# Patient Record
Sex: FEMALE | Race: OTHER | NOT HISPANIC OR LATINO | URBAN - METROPOLITAN AREA
[De-identification: names, ages, dates, MRNs, and addresses within clinical notes are randomized per-mention and may not be internally consistent; named-entity substitution may affect disease eponyms.]

---

## 2020-10-23 ENCOUNTER — EMERGENCY (EMERGENCY)
Facility: HOSPITAL | Age: 65
LOS: 1 days | Discharge: HOME | End: 2020-10-23
Attending: EMERGENCY MEDICINE | Admitting: STUDENT IN AN ORGANIZED HEALTH CARE EDUCATION/TRAINING PROGRAM
Payer: MEDICARE

## 2020-10-23 VITALS
DIASTOLIC BLOOD PRESSURE: 90 MMHG | TEMPERATURE: 98 F | RESPIRATION RATE: 18 BRPM | OXYGEN SATURATION: 100 % | HEART RATE: 92 BPM | SYSTOLIC BLOOD PRESSURE: 142 MMHG

## 2020-10-23 PROCEDURE — 12001 RPR S/N/AX/GEN/TRNK 2.5CM/<: CPT

## 2020-10-23 PROCEDURE — 99283 EMERGENCY DEPT VISIT LOW MDM: CPT | Mod: 25

## 2020-10-23 NOTE — ED PROVIDER NOTE - OBJECTIVE STATEMENT
Patient was cutting open package and cut her thumb on the lt. +++bleeding. did not stop with presure. nl motion, no joint involement. nl sensation.  Patient with htn as hx no blood thinners.

## 2020-10-23 NOTE — ED PROVIDER NOTE - ATTENDING CONTRIBUTION TO CARE
I performed a history and physical exam of the patient and discussed their management with the resident and /or advanced care provider. I reviewed the resident and /or ACP's note and agree with the documented findings and plan of care. My medical decision making and observations are found above.  Lungs clear, 2 cm laceration on pad of thumb.

## 2020-10-23 NOTE — ED PROCEDURE NOTE - ATTENDING CONTRIBUTION TO CARE
I Addi Cortez MD discussed the procedure with the resident and or ACP and went over risks and benefits as well as indications for the procedure. I was present for key portions of the procedure itself and assisted as needed.

## 2020-10-23 NOTE — ED PROVIDER NOTE - PMH
Essential hypertension  Essential hypertension  Hyperparathyroidism  Hyperparathyroidism  Uterine leiomyoma  Fibroid uterus

## 2020-10-23 NOTE — ED PROVIDER NOTE - PATIENT PORTAL LINK FT
You can access the FollowMyHealth Patient Portal offered by [facility] by registering at the following website: [email or web address]. By joining [facility]’s FollowMyHealth portal, you will also be able to view your health information using other applications (apps) compatible with our system.

## 2020-10-23 NOTE — ED PROVIDER NOTE - CLINICAL SUMMARY MEDICAL DECISION MAKING FREE TEXT BOX
Dana: laceration whichg opens on pad of thumb. will need a few stitches. dermadond would not work well with

## 2020-10-29 ENCOUNTER — EMERGENCY (EMERGENCY)
Facility: HOSPITAL | Age: 65
LOS: 1 days | Discharge: HOME | End: 2020-10-29
Attending: EMERGENCY MEDICINE | Admitting: EMERGENCY MEDICINE
Payer: MEDICARE

## 2020-10-29 VITALS
SYSTOLIC BLOOD PRESSURE: 147 MMHG | TEMPERATURE: 98 F | HEART RATE: 68 BPM | OXYGEN SATURATION: 100 % | DIASTOLIC BLOOD PRESSURE: 93 MMHG | RESPIRATION RATE: 16 BRPM

## 2020-10-29 PROCEDURE — L9995: CPT

## 2020-10-29 NOTE — ED PROVIDER NOTE - PATIENT PORTAL LINK FT
You can access the FollowMyHealth Patient Portal offered by [facility] by registering at the following website: [email or web address]. By joining [facility]’s FollowMyHealth portal, you will also be able to view your health information using other applications (apps) compatible with our system.

## 2020-10-29 NOTE — ED PROVIDER NOTE - PHYSICAL EXAMINATION
Attending/Jonas: Well-appearing, NAD; Left thumb-ventral aspect-wound well-healed, 4 interrupted sutures in -place no STS, d/c or erythema

## 2020-10-29 NOTE — ED PROVIDER NOTE - NSFOLLOWUPINSTRUCTIONS_ED_ALL_ED_FT
Apply antibiotic ointment to the affected area twice a day    If develop swelling, discharge, redness return back to the emergency department

## 2021-04-03 ENCOUNTER — EMERGENCY (EMERGENCY)
Facility: HOSPITAL | Age: 66
LOS: 1 days | Discharge: HOME | End: 2021-04-03
Attending: EMERGENCY MEDICINE | Admitting: EMERGENCY MEDICINE
Payer: MEDICARE

## 2021-04-03 VITALS
DIASTOLIC BLOOD PRESSURE: 101 MMHG | TEMPERATURE: 98 F | RESPIRATION RATE: 20 BRPM | SYSTOLIC BLOOD PRESSURE: 160 MMHG | OXYGEN SATURATION: 98 % | HEART RATE: 97 BPM

## 2021-04-03 VITALS
RESPIRATION RATE: 20 BRPM | TEMPERATURE: 98 F | OXYGEN SATURATION: 100 % | HEART RATE: 72 BPM | DIASTOLIC BLOOD PRESSURE: 100 MMHG | SYSTOLIC BLOOD PRESSURE: 160 MMHG

## 2021-04-03 LAB
ALBUMIN SERPL ELPH-MCNC: 4.1 G/DL — SIGNIFICANT CHANGE UP (ref 3.3–5)
ALP SERPL-CCNC: 94 U/L — SIGNIFICANT CHANGE UP (ref 40–120)
ALT FLD-CCNC: 12 U/L — SIGNIFICANT CHANGE UP (ref 4–33)
ANION GAP SERPL CALC-SCNC: 11 MMOL/L — SIGNIFICANT CHANGE UP (ref 7–14)
ANION GAP SERPL CALC-SCNC: 9 MMOL/L — SIGNIFICANT CHANGE UP (ref 7–14)
APTT BLD: 32 SEC — SIGNIFICANT CHANGE UP (ref 27–36.3)
AST SERPL-CCNC: 35 U/L — HIGH (ref 4–32)
BASOPHILS # BLD AUTO: 0.03 K/UL — SIGNIFICANT CHANGE UP (ref 0–0.2)
BASOPHILS NFR BLD AUTO: 0.4 % — SIGNIFICANT CHANGE UP (ref 0–2)
BILIRUB SERPL-MCNC: 0.4 MG/DL — SIGNIFICANT CHANGE UP (ref 0.2–1.2)
BLD GP AB SCN SERPL QL: NEGATIVE — SIGNIFICANT CHANGE UP
BUN SERPL-MCNC: 18 MG/DL — SIGNIFICANT CHANGE UP (ref 7–23)
BUN SERPL-MCNC: 18 MG/DL — SIGNIFICANT CHANGE UP (ref 7–23)
CALCIUM SERPL-MCNC: 10.4 MG/DL — SIGNIFICANT CHANGE UP (ref 8.4–10.5)
CALCIUM SERPL-MCNC: 10.7 MG/DL — HIGH (ref 8.4–10.5)
CHLORIDE SERPL-SCNC: 104 MMOL/L — SIGNIFICANT CHANGE UP (ref 98–107)
CHLORIDE SERPL-SCNC: 106 MMOL/L — SIGNIFICANT CHANGE UP (ref 98–107)
CO2 SERPL-SCNC: 23 MMOL/L — SIGNIFICANT CHANGE UP (ref 22–31)
CO2 SERPL-SCNC: 26 MMOL/L — SIGNIFICANT CHANGE UP (ref 22–31)
CREAT SERPL-MCNC: 0.74 MG/DL — SIGNIFICANT CHANGE UP (ref 0.5–1.3)
CREAT SERPL-MCNC: 0.76 MG/DL — SIGNIFICANT CHANGE UP (ref 0.5–1.3)
EOSINOPHIL # BLD AUTO: 0.1 K/UL — SIGNIFICANT CHANGE UP (ref 0–0.5)
EOSINOPHIL NFR BLD AUTO: 1.3 % — SIGNIFICANT CHANGE UP (ref 0–6)
GLUCOSE SERPL-MCNC: 101 MG/DL — HIGH (ref 70–99)
GLUCOSE SERPL-MCNC: 103 MG/DL — HIGH (ref 70–99)
HCT VFR BLD CALC: 43.5 % — SIGNIFICANT CHANGE UP (ref 34.5–45)
HGB BLD-MCNC: 13.9 G/DL — SIGNIFICANT CHANGE UP (ref 11.5–15.5)
IANC: 3.96 K/UL — SIGNIFICANT CHANGE UP (ref 1.5–8.5)
IMM GRANULOCYTES NFR BLD AUTO: 0.1 % — SIGNIFICANT CHANGE UP (ref 0–1.5)
INR BLD: 1 RATIO — SIGNIFICANT CHANGE UP (ref 0.88–1.16)
LYMPHOCYTES # BLD AUTO: 2.85 K/UL — SIGNIFICANT CHANGE UP (ref 1–3.3)
LYMPHOCYTES # BLD AUTO: 37.6 % — SIGNIFICANT CHANGE UP (ref 13–44)
MCHC RBC-ENTMCNC: 26.1 PG — LOW (ref 27–34)
MCHC RBC-ENTMCNC: 32 GM/DL — SIGNIFICANT CHANGE UP (ref 32–36)
MCV RBC AUTO: 81.8 FL — SIGNIFICANT CHANGE UP (ref 80–100)
MONOCYTES # BLD AUTO: 0.63 K/UL — SIGNIFICANT CHANGE UP (ref 0–0.9)
MONOCYTES NFR BLD AUTO: 8.3 % — SIGNIFICANT CHANGE UP (ref 2–14)
NEUTROPHILS # BLD AUTO: 3.96 K/UL — SIGNIFICANT CHANGE UP (ref 1.8–7.4)
NEUTROPHILS NFR BLD AUTO: 52.3 % — SIGNIFICANT CHANGE UP (ref 43–77)
NRBC # BLD: 0 /100 WBCS — SIGNIFICANT CHANGE UP
NRBC # FLD: 0 K/UL — SIGNIFICANT CHANGE UP
PLATELET # BLD AUTO: 277 K/UL — SIGNIFICANT CHANGE UP (ref 150–400)
POTASSIUM SERPL-MCNC: 4 MMOL/L — SIGNIFICANT CHANGE UP (ref 3.5–5.3)
POTASSIUM SERPL-MCNC: 7.2 MMOL/L — CRITICAL HIGH (ref 3.5–5.3)
POTASSIUM SERPL-SCNC: 4 MMOL/L — SIGNIFICANT CHANGE UP (ref 3.5–5.3)
POTASSIUM SERPL-SCNC: 7.2 MMOL/L — CRITICAL HIGH (ref 3.5–5.3)
PROT SERPL-MCNC: 8.2 G/DL — SIGNIFICANT CHANGE UP (ref 6–8.3)
PROTHROM AB SERPL-ACNC: 11.5 SEC — SIGNIFICANT CHANGE UP (ref 10.6–13.6)
RBC # BLD: 5.32 M/UL — HIGH (ref 3.8–5.2)
RBC # FLD: 16.9 % — HIGH (ref 10.3–14.5)
RH IG SCN BLD-IMP: POSITIVE — SIGNIFICANT CHANGE UP
SODIUM SERPL-SCNC: 138 MMOL/L — SIGNIFICANT CHANGE UP (ref 135–145)
SODIUM SERPL-SCNC: 141 MMOL/L — SIGNIFICANT CHANGE UP (ref 135–145)
WBC # BLD: 7.58 K/UL — SIGNIFICANT CHANGE UP (ref 3.8–10.5)
WBC # FLD AUTO: 7.58 K/UL — SIGNIFICANT CHANGE UP (ref 3.8–10.5)

## 2021-04-03 PROCEDURE — 99284 EMERGENCY DEPT VISIT MOD MDM: CPT

## 2021-04-03 RX ORDER — CYCLOPENTOLATE HYDROCHLORIDE 10 MG/ML
1 SOLUTION/ DROPS OPHTHALMIC ONCE
Refills: 0 | Status: COMPLETED | OUTPATIENT
Start: 2021-04-03 | End: 2021-04-03

## 2021-04-03 RX ORDER — PREDNISOLONE SODIUM PHOSPHATE 1 %
1 DROPS OPHTHALMIC (EYE) ONCE
Refills: 0 | Status: COMPLETED | OUTPATIENT
Start: 2021-04-03 | End: 2021-04-03

## 2021-04-03 RX ORDER — ACETAMINOPHEN 500 MG
650 TABLET ORAL ONCE
Refills: 0 | Status: COMPLETED | OUTPATIENT
Start: 2021-04-03 | End: 2021-04-03

## 2021-04-03 RX ADMIN — Medication 1 DROP(S): at 23:07

## 2021-04-03 RX ADMIN — Medication 650 MILLIGRAM(S): at 23:41

## 2021-04-03 RX ADMIN — CYCLOPENTOLATE HYDROCHLORIDE 1 DROP(S): 10 SOLUTION/ DROPS OPHTHALMIC at 23:07

## 2021-04-03 RX ADMIN — Medication 1 DROP(S): at 20:49

## 2021-04-03 NOTE — ED PROVIDER NOTE - OBJECTIVE STATEMENT
67yo F with a pmhx of HTN presents to the ED for acute onset of eye pain that started earlier this morning. Her pain is localized over her L eye and does not radiate anywhere else. She denies any changes in vision. She admits to photophobia. She was recently treated with tobramycin for her eye as an outpatient 2 weeks ago. She denies any N/V/D. she denies any purulent discharge from the L eye. She admits to pain with movement of the L eye. Her R eye is unremarkable.

## 2021-04-03 NOTE — ED PROVIDER NOTE - CLINICAL SUMMARY MEDICAL DECISION MAKING FREE TEXT BOX
67yo F with a pmhx of HTN presents to the ED for acute onset of eye pain that started earlier this morning. Her pain is localized over her L eye and does not radiate anywhere else. She denies any changes in vision. She admits to photophobia. She was recently treated with tobramycin for her eye as an outpatient 2 weeks ago. VSS. R eye unremarkable. L eye significant for conjunctival injection with no chemosis or hyphema. visual acuity intact. 20/30 in L eye. IOP normal. pain on movement of L eye while adducting. concerns for possible orbital cellulitis vs iritis vs uveitis. will order labs, ophthalmology consult.

## 2021-04-03 NOTE — ED ADULT TRIAGE NOTE - CHIEF COMPLAINT QUOTE
pt have been treated for the left eye swelling, with antibx x 2 wks, coming today with increase left eye pressure, light sensitivity, denies decrease in vision.   Hx. HTN

## 2021-04-03 NOTE — ED PROVIDER NOTE - PROGRESS NOTE DETAILS
SANDY De La Cruz: Pt signed out to me pending rpt BMP, K is 4.0 will d/c home with optho follow up on monday in clinic, pt was already given cyclogyl and pred forte drops and understands how to use them. SANDY De La Cruz: Pt signed out to me pending rpt BMP, K is 4.0 will d/c home with optho follow up on monday in clinic, pt was already given cyclogyl and pred forte drops and understands how to use them. Pt requesting tylenol upon discharge

## 2021-04-03 NOTE — ED PROVIDER NOTE - NSFOLLOWUPINSTRUCTIONS_ED_ALL_ED_FT
Patient should follow-up with [facility] Department of Ophthalmology on Monday at 1:00pm    [address]  [place] [address]  [phone] Patient should follow-up with [facility] Department of Ophthalmology on Monday at 1:00pm    [address]  [place] [address]  [phone]    Apply Cyclogyl 1 drop three times a day to the left eye  Apply Pred Forte 1 drop 6 times a day to the left eye

## 2021-04-03 NOTE — ED PROVIDER NOTE - ATTENDING CONTRIBUTION TO CARE
Pt with L eye pain and photophobia, normal pressures, concerning for anterior uveitis, ophtho called.

## 2021-04-03 NOTE — ED PROVIDER NOTE - PATIENT PORTAL LINK FT
You can access the FollowMyHealth Patient Portal offered by [facility] by registering at the following website: [email or web address]. By joining [facility]s FollowMyHealth portal, you will also be able to view your health information using other applications (apps) compatible with our system.

## 2021-04-03 NOTE — ED PROVIDER NOTE - NSFOLLOWUPCLINICS_GEN_ALL_ED_FT
[facility] Ophthalmology  Ophthalmology  [address] [place] [address], [facility] [address]  [place] [address]  Phone: [phone]  Fax:

## 2021-04-03 NOTE — CONSULT NOTE ADULT - SUBJECTIVE AND OBJECTIVE BOX
[facility] DEPARTMENT OF OPHTHALMOLOGY - INITIAL ADULT CONSULT  -----------------------------------------------------------------------------------------------------------------  Rodrigo Vanesas MD PGY 2  Pager: [phone]  -----------------------------------------------------------------------------------------------------------------    HPI: Pt is a 65 y/o F with PMH of HTN reports that L eye has been red and "heavy" since this morning along with photosensitivity of the left eye. Admits to pain with L eye movement. Denies discharge, tearing, or itching. No change in vision, though she says it is difficult to tell with extreme photosensitivity. 6 weeks ago, she states that her mask hit her L eye. Patient went to [place] eye doctor 2 weeks ago for a "sore" on lower palpebral conj (Rx Tobramycin drops and antibiotic ointment).    Past Medical History: HTN  Past Ocular History: None  Drops: S/p Tobramycin drops and antibiotic ointment OS  Allergies: No known allergies to medications  Family History: No family history of eye diseases  [facility]does not remember name)    Review of Systems:  Constitutional: No fever, chills  Eyes: Admits to erythema left eye. No blurry vision, flashes, floaters, FBS, discharge, double vision, OU  Neuro: No tremors  Cardiovascular: No chest pain, palpitations  Respiratory: No SOB, no cough  GI: No nausea, vomiting, abdominal pain    Vital Signs: T(C): 36.9 (04-03-21 @ 20:54)  T(F): 98.5 (04-03-21 @ 20:54), Max: 98.5 (04-03-21 @ 20:54)  HR: 72 (04-03-21 @ 20:54) (72 - 97)  BP: 160/100 (04-03-21 @ 20:54) (160/100 - 160/101)  RR:  (20 - 20)  SpO2:  (98% - 100%)  Wt(kg): --  General: AAO x 3, appropriate mood and affect    Ophthalmology Exam:      Vitreous: wnl OU  Disc, cup/disc: sharp and pink, 0.4 OU  Macula: wnl OU  Vessels: wnl OU  Periphery: wnl OU    Labs/Imaging:  ***   [facility] DEPARTMENT OF OPHTHALMOLOGY - INITIAL ADULT CONSULT  -----------------------------------------------------------------------------------------------------------------  Rodrigo Vanessa MD PGY 2  Pager: [phone]  -----------------------------------------------------------------------------------------------------------------    HPI: Pt is a 67 y/o F with PMH of HTN reports that L eye has been red and "heavy" since this morning along with photophobia of the left eye. Admits to pain with L eye movement. Denies discharge, tearing, or itching. No change in vision, though she says it is difficult to tell with extreme photosensitivity. 6 weeks ago, she states that her mask hit her L eye. Patient went to [place] eye doctor 2 weeks ago for a "sore" on lower palpebral conj (Rx Tobramycin drops and antibiotic ointment).    Past Medical History: HTN  Past Ocular History: None  Drops: S/p Tobramycin drops and antibiotic ointment OS  Allergies: No known allergies to medications  Family History: No family history of eye diseases  [facility]    Review of Systems:  Constitutional: No fever, chills  Eyes: Admits to erythema left eye. No blurry vision, flashes, floaters, FBS, discharge, double vision, OU  Neuro: No tremors  Cardiovascular: No chest pain, palpitations  Respiratory: No SOB, no cough  GI: No nausea, vomiting, abdominal pain    Vital Signs: T(C): 36.9 (04-03-21 @ 20:54)  T(F): 98.5 (04-03-21 @ 20:54), Max: 98.5 (04-03-21 @ 20:54)  HR: 72 (04-03-21 @ 20:54) (72 - 97)  BP: 160/100 (04-03-21 @ 20:54) (160/100 - 160/101)  RR:  (20 - 20)  SpO2:  (98% - 100%)  Wt(kg): --  General: AAO x 3, appropriate mood and affect      Ophthalmology Exam:  Visual acuity (cc, near): 20/20 OU.  Pupils: PERRL OU, no APD  Intraocular Pressure: 14, 9    Extraocular movements (EOMs): Full OU, mild pain OS, no diplopia    Slit Lamp Exam  External: Normal OU.  Lids/Lashes/Lacrimal Ducts: Flat OU.  Sclera/Conjunctiva: White and quiet OD. 1-2+ conj injection OS.  Cornea: Clear OU.  Anterior Chamber: Deep and formed OD. 2+ cell, 2+ flare OS.    Iris: Flat OU.  Lens: Clear OU.    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    1. Anterior Uveitis Left Eye  -Patient presents with 1 day of eye redness and significant photophobia left eye.  -Patient to have 1-2+ conjunctival injection left eye with 2+ cell and 2+ flare indicating anterior uveitis. First episode occurring.  -Recommend Cyclogyl 1 drop 3 times per day left eye and Prednisolone forte 1 drop 6 times per day.    Outpatient Follow-up: Patient should follow-up with [facility] Department of Ophthalmology on Monday at 1:00pm    [address]  [place] [address]  [phone]    Rodrigo Vanessa MD, PGY-2  Pager: [phone]  Also available on [employer] Teams   [facility] DEPARTMENT OF OPHTHALMOLOGY - INITIAL ADULT CONSULT  -----------------------------------------------------------------------------------------------------------------  Rodrigo Vanessa MD PGY 2  Pager: [phone]  -----------------------------------------------------------------------------------------------------------------    HPI: Pt is a 65 y/o F with PMH of HTN reports that L eye has been red and "heavy" since this morning along with photophobia of the left eye. Admits to pain with L eye movement. Denies discharge, tearing, or itching. No change in vision, though she says it is difficult to tell with extreme photosensitivity. 6 weeks ago, she states that her mask hit her L eye. Patient went to [place] eye doctor 2 weeks ago for a "sore" on lower palpebral conj (Rx Tobramycin drops and antibiotic ointment).    Past Medical History: HTN  Past Ocular History: None  Drops: S/p Tobramycin drops and antibiotic ointment OS  Allergies: No known allergies to medications  Family History: No family history of eye diseases  [facility]    Review of Systems:  Constitutional: No fever, chills  Eyes: Admits to erythema left eye. No blurry vision, flashes, floaters, FBS, discharge, double vision, OU  Neuro: No tremors  Cardiovascular: No chest pain, palpitations  Respiratory: No SOB, no cough  GI: No nausea, vomiting, abdominal pain    Vital Signs: T(C): 36.9 (04-03-21 @ 20:54)  T(F): 98.5 (04-03-21 @ 20:54), Max: 98.5 (04-03-21 @ 20:54)  HR: 72 (04-03-21 @ 20:54) (72 - 97)  BP: 160/100 (04-03-21 @ 20:54) (160/100 - 160/101)  RR:  (20 - 20)  SpO2:  (98% - 100%)  Wt(kg): --  General: AAO x 3, appropriate mood and affect      Ophthalmology Exam:  Visual acuity (cc, near): 20/20 OU.  Pupils: PERRL OU, no APD  Intraocular Pressure: 14, 9    Extraocular movements (EOMs): Full OU, mild pain OS, no diplopia    Slit Lamp Exam  External: Normal OU.  Lids/Lashes/Lacrimal Ducts: Flat OU.  Sclera/Conjunctiva: White and quiet OD. 1-2+ conj injection OS.  Cornea: Clear OU.  Anterior Chamber: Deep and formed OD. 2+ cell, 2+ flare OS.    Iris: Flat OU.  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    1. Anterior Uveitis Left Eye  -Patient presents with 1 day of eye redness and significant photophobia left eye.  -Patient to have 1-2+ conjunctival injection left eye with 2+ cell and 2+ flare indicating anterior uveitis. First episode occurring.  -Recommend Cyclogyl 1 drop 3 times per day left eye and Prednisolone forte 1 drop 6 times per day.    Outpatient Follow-up: Patient should follow-up with [facility] Department of Ophthalmology on Monday at 1:00pm    [address]  [place] [address]  [phone]    Rodrigo Vanessa MD, PGY-2  Pager: [phone]  Also available on [employer] Teams   [facility] DEPARTMENT OF OPHTHALMOLOGY - INITIAL ADULT CONSULT  -----------------------------------------------------------------------------------------------------------------  Rodrigo Vanessa MD PGY 2  Pager: [phone]  -----------------------------------------------------------------------------------------------------------------    HPI: Pt is a 67 y/o F with PMH of HTN reports that L eye has been red and "heavy" since this morning along with photophobia of the left eye. Admits to pain with L eye movement. Denies discharge, tearing, or itching. No change in vision, though she says it is difficult to tell with extreme photosensitivity. 6 weeks ago, she states that her mask hit her L eye. Patient went to [place] eye doctor 2 weeks ago for a "sore" on lower palpebral conj (Rx Tobramycin drops and antibiotic ointment).    Past Medical History: HTN  Past Ocular History: None  Drops: S/p Tobramycin drops and antibiotic ointment OS  Allergies: No known allergies to medications  Family History: No family history of eye diseases  [facility]    Review of Systems:  Constitutional: No fever, chills  Eyes: Admits to erythema left eye. No blurry vision, flashes, floaters, FBS, discharge, double vision, OU  Neuro: No tremors  Cardiovascular: No chest pain, palpitations  Respiratory: No SOB, no cough  GI: No nausea, vomiting, abdominal pain    Vital Signs: T(C): 36.9 (04-03-21 @ 20:54)  T(F): 98.5 (04-03-21 @ 20:54), Max: 98.5 (04-03-21 @ 20:54)  HR: 72 (04-03-21 @ 20:54) (72 - 97)  BP: 160/100 (04-03-21 @ 20:54) (160/100 - 160/101)  RR:  (20 - 20)  SpO2:  (98% - 100%)  Wt(kg): --  General: AAO x 3, appropriate mood and affect      Ophthalmology Exam:  Visual acuity (cc, near): 20/20 OU.  Pupils: PERRL OU, no APD  Intraocular Pressure: 14, 9    Extraocular movements (EOMs): Full OU, mild pain OS, no diplopia    Slit Lamp Exam  External: Normal OU.  Lids/Lashes/Lacrimal Ducts: Flat OU.  Sclera/Conjunctiva: White and quiet OD. 1-2+ conj injection OS.  Cornea: Clear OU.  Anterior Chamber: Deep and formed OD. 2+ cell, 2+ flare OS.    Iris: Flat OU.  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    1. Anterior Uveitis Left Eye  -Patient presents with 1 day of eye redness and significant photophobia left eye.  -Patient to have 1-2+ conjunctival injection left eye with 2+ cell and 2+ flare indicating anterior uveitis. First episode occurring.  -Recommend Cyclogyl 1 drop 3 times per day left eye and Prednisolone forte 1 drop 6 times per day.    Outpatient Follow-up: Patient should follow-up with [facility] Department of Ophthalmology on Monday at 1:00pm    [address]  [place] [address]  [phone]    Rodrigo Vanessa MD, PGY-2  Pager: [phone]  Also available on [employer] Teams

## 2021-04-03 NOTE — ED ADULT NURSE NOTE - OBJECTIVE STATEMENT
Pt received to intake room 13. Pt comes to ED c/o left eye pain starting today. States pain is worse when exposed to light, feels better when lights are turned off. States she has been treated with antibiotics X2 weeks for left eye swelling. States she feels a "pressure in my eye is getting worse." 20 G IV Placed to right forearm. Pt is A&Ox4, ambulates independently. Hx of HTN

## 2021-04-05 ENCOUNTER — APPOINTMENT (OUTPATIENT)
Dept: OPHTHALMOLOGY | Facility: CLINIC | Age: 66
End: 2021-04-05

## 2021-04-09 ENCOUNTER — APPOINTMENT (OUTPATIENT)
Dept: OPHTHALMOLOGY | Facility: CLINIC | Age: 66
End: 2021-04-09

## 2021-04-29 ENCOUNTER — APPOINTMENT (OUTPATIENT)
Age: 66
End: 2021-04-29

## 2021-06-18 NOTE — ED PROVIDER NOTE - OBJECTIVE STATEMENT
Attending/Jonas: 66 yo F s/p suture repair now here for wound check and suture removal. Pt initially seen in the ED on 10/23 after cutting her left thumb while opening a package with a knife. She had seven suture placed. Pt denies pain, d/c, numbness or swelling. Attending/Jonas: 64 yo F s/p suture repair now here for wound check and suture removal. Pt initially seen in the ED on 10/23 after cutting her left thumb while opening a package with a knife. She had seven suture placed. Pt denies pain, d/c, numbness or swelling. Pt stated a few of the sutures had "fallen out." 17.3

## 2024-05-06 PROBLEM — Z00.00 ENCOUNTER FOR PREVENTIVE HEALTH EXAMINATION: Status: ACTIVE | Noted: 2024-05-06

## 2024-05-10 ENCOUNTER — APPOINTMENT (OUTPATIENT)
Dept: ORTHOPEDIC SURGERY | Facility: CLINIC | Age: 69
End: 2024-05-10
Payer: MEDICARE

## 2024-05-10 VITALS — BODY MASS INDEX: 31.34 KG/M2 | HEIGHT: 66 IN | WEIGHT: 195 LBS

## 2024-05-10 DIAGNOSIS — I10 ESSENTIAL (PRIMARY) HYPERTENSION: ICD-10-CM

## 2024-05-10 DIAGNOSIS — M75.31 CALCIFIC TENDINITIS OF RIGHT SHOULDER: ICD-10-CM

## 2024-05-10 PROCEDURE — 73030 X-RAY EXAM OF SHOULDER: CPT | Mod: RT

## 2024-05-10 PROCEDURE — 20611 DRAIN/INJ JOINT/BURSA W/US: CPT | Mod: RT

## 2024-05-10 PROCEDURE — 73010 X-RAY EXAM OF SHOULDER BLADE: CPT | Mod: RT

## 2024-05-10 PROCEDURE — 99203 OFFICE O/P NEW LOW 30 MIN: CPT | Mod: 25

## 2024-05-10 PROCEDURE — J3490M: CUSTOM | Mod: NC

## 2024-05-10 RX ORDER — AMLODIPINE BESYLATE 10 MG/1
10 TABLET ORAL
Refills: 0 | Status: ACTIVE | COMMUNITY

## 2024-05-10 NOTE — HISTORY OF PRESENT ILLNESS
[6] : 6 [4] : 4 [Localized] : localized [Sleep] : sleep [de-identified] : 5/10/24: 70 yo RHD female with right shoulder pain since Feb 2024. Denies specific injury. She states pain is gradually worsening. She has pain at night which wakes her. There is pain deep and anterior. She tried Tylenol. Denies numbness/tingling.  PMHx: HTN [] : Post Surgical Visit: no [FreeTextEntry1] : right shoulder [FreeTextEntry3] : 3 months  [FreeTextEntry5] : No known injury, pt states pain is worse at night, and hears a crack  [FreeTextEntry6] : discomfort

## 2024-05-10 NOTE — PHYSICAL EXAM
[Right] : right shoulder [5 ___] : forward flexion 5[unfilled]/5 [5___] : internal rotation 5[unfilled]/5 [] : negative Kelley [FreeTextEntry9] :  ER 49

## 2024-05-10 NOTE — ASSESSMENT
[FreeTextEntry1] : R calcific tendonitis. Activity modification. Ice. Start PT. R SA injection given. Tylenol, Aleve prn. RTO 6 weeks, consider MRI.  Procedure Note: Large Joint Injection was performed because of pain and inflammation, failure of conservative treatment.   Medications: Depo-Medrol: 1 cc, 80 mg. Lidocaine: 2 cc, 1%.  Marcaine: 2 cc, .25%.   Medication was injected in the right subacromial space. Patient has tried OTC's including aspirin, Ibuprofen, Aleve etc or prescription NSAIDs, and/or exercises at home and/ or physical therapy without satisfactory response. The risks, benefits, and alternatives to cortisone injection were explained in full to the patient. Risks outlined include but are not limited to infection, sepsis, bleeding, scarring, skin discoloration, temporary increase in pain, syncopal episode, failure to resolve symptoms, allergic reaction, symptom recurrence, and elevation of blood sugar in diabetics. Patient understood the risks. All questions were answered. After discussion of options, patient requested an injection. Oral informed consent was obtained and sterile prep of the injection site was performed using alcohol. Sterile technique was utilized for the procedure including the preparation of the solutions used for the injection. Ethyl chloride spray was used topically.  Sterile technique used. Patient tolerated procedure well. Post Procedure Instructions: Patient was advised to call if redness, pain, or fever occur and apply ice for 15 min. out of every hour for the next 12-24 hours as tolerated. patient was advised to rest the joint(s) for 2 days.  Ultrasound Guidance was used for the following reasons: for prior failure or difficult injection and to visualize tearing and inflammation. Ultrasound guided injection was performed of the shoulder, visualization of the needle and placement of injection was performed without complication.

## 2024-05-14 NOTE — ED ADULT TRIAGE NOTE - ESI TRIAGE ACUITY LEVEL, MLM
Consider tracking intake of meals on an blanca such as P[employer] or [employer].  Aim for recommended calories of 1500 per day.   Aim for 20% of your calories coming from protein or 75g of protein daily  2.   Increase marsha protein snacks between meals.  Make them planned snacks such as nuts, hummus and vegetables, apples with cheese stick or peanut butter, or Greek Yogurt such as [employer].    3.  Consider a high protein drink between meals such as Premier [employer] or [employer].   4.  Aim to drink 64 oz of water or other non-caloric beverages.    5.  Recommend at least 150 minutes of exercise weekly or 30 minutes 5x/week.      5

## 2024-06-06 NOTE — ED PROVIDER NOTE - DOMESTIC TRAVEL HIGH RISK QUESTION
AdmissionCare    Guideline: General Observation, Observation    Based on the indications selected for the patient, the bed status of Observation was determined to be MET    The following indications were selected as present at the time of evaluation of the patient:      - Clinical care needed is not appropriate for lower level of care (ie, discharge to outpatient setting not appropriate).   - Clinical care (eg, testing, monitoring, or treatment) needed beyond usual emergency department time frame (eg, 3 to 4 hours)    - Gastroenterologic condition or finding (eg, suspected ileus or obstruction, fecal impaction, constipation, gastroparesis, appendicitis or other abdominal infection, peptic ulcer disease without suspected bleeding, hepatitis)    AdmissionCare documentation entered by: Roge Ruffin    [facility], 28th edition, Copyright © 2024 [facility] [employer], [facility] All Rights Reserved.  [id number]:00
No

## 2024-11-04 NOTE — ED PROCEDURE NOTE - NS ED PROC PERFORMED BY1 FT
[Levothyroxine] : The patient was instructed to take Levothyroxine on an empty stomach, separate from vitamins, and wait at least 30 minutes before eating [FreeTextEntry1] : Patient here for evalution of hypothyroidism Diagnosed 5 years ago in [place] however does not have any previous records with her to review today Dicsussed levothyroxine is a life-long medication and consequences of stopping medication. She has underlying anxiety and bipolar disorder Currently on levothyroxine 100mcg daily Can consider referral to [facility] weight management if she continues to gain weight Reportedly has remote h/o thyroid nodules, will get sonogram results  for further evaluation  h/o prediabetes Will check serum A1c  Not on metformin Counseled regarding diet and exercise in terms of lifestyle modifications  Answered all questions today; patient verbalized understanding of the above RTO in 6 months/once provider returns from maternity leave [place]